# Patient Record
Sex: MALE
[De-identification: names, ages, dates, MRNs, and addresses within clinical notes are randomized per-mention and may not be internally consistent; named-entity substitution may affect disease eponyms.]

---

## 2022-02-22 PROBLEM — Z00.00 ENCOUNTER FOR PREVENTIVE HEALTH EXAMINATION: Status: ACTIVE | Noted: 2022-02-22

## 2022-03-02 ENCOUNTER — APPOINTMENT (OUTPATIENT)
Dept: HUMAN REPRODUCTION | Facility: CLINIC | Age: 42
End: 2022-03-02

## 2022-03-18 ENCOUNTER — APPOINTMENT (OUTPATIENT)
Dept: UROLOGY | Facility: CLINIC | Age: 42
End: 2022-03-18
Payer: COMMERCIAL

## 2022-03-18 ENCOUNTER — NON-APPOINTMENT (OUTPATIENT)
Age: 42
End: 2022-03-18

## 2022-03-18 VITALS
HEART RATE: 44 BPM | TEMPERATURE: 97.9 F | WEIGHT: 150 LBS | SYSTOLIC BLOOD PRESSURE: 113 MMHG | HEIGHT: 69 IN | BODY MASS INDEX: 22.22 KG/M2 | DIASTOLIC BLOOD PRESSURE: 69 MMHG

## 2022-03-18 DIAGNOSIS — E29.1 TESTICULAR HYPOFUNCTION: ICD-10-CM

## 2022-03-18 PROCEDURE — 99204 OFFICE O/P NEW MOD 45 MIN: CPT

## 2022-03-18 NOTE — ASSESSMENT
[FreeTextEntry1] : hypogonadism\par small bilateral varicocele\par i am hesitant to consider varicocelctomy given female age\par TT FSH E2 LH\par consider lexapro\par consider scrotal sono\par proceed to ART

## 2022-03-18 NOTE — PHYSICAL EXAM
[Urethral Meatus] : meatus normal [Penis Abnormality] : normal circumcised penis [Urinary Bladder Findings] : the bladder was normal on palpation [Scrotum] : the scrotum was normal [Epididymis] : the epididymides were normal [Testes Tenderness] : no tenderness of the testes [Testes Mass (___cm)] : there were no testicular masses [FreeTextEntry1] : grade I subtle bilatearl varicocele. 14cc bilateral testes no mass

## 2022-03-18 NOTE — LETTER BODY
[Dear  ___] : Dear  [unfilled], [FreeTextEntry2] : Rachelle Huddleston MD\par [facility]\par [address]\[place] [address] [FreeTextEntry1] : I had the pleasure of seeing ROSY JACKSON, a 41 year old man,  to your patient Fern Cartwright, in the office in consultation today. Please see the attached note for full details.\par \par Thank you very much for allowing me to participate in the care of this patient. If you have any questions please feel free to call me at any time. \par \par \par Sincerely yours,\par \par \par \par Epi Benavides MD, JULIANNE\par Director, Male Fertility and Microsurgery\par  of Urology\par [facility]\par

## 2022-03-18 NOTE — HISTORY OF PRESENT ILLNESS
[FreeTextEntry1] : 41M in relationship with Fern Cartwright (42F) presents with failure to conceive x 6 motnhs. female partner with history of low ovarian reserve. frozen eggs at age 36\par considering IVF\par no previous pregnancies either partner\par no ED\par no EjD\par no pain \par using lexapro and trazodone\par nicorette\par SA:\par 2/2022\par 0.5ml/114 mil/ml/9% motile/21% morph\par 3/14/22\par 0.6 ml/105 mil/ml/40% motile/17% morph\par

## 2022-03-21 LAB
ESTRADIOL SERPL-MCNC: 24 PG/ML
FSH SERPL-MCNC: 3.7 IU/L
LH SERPL-ACNC: 3.7 IU/L
TESTOST FREE SERPL-MCNC: 12.8 PG/ML
TESTOST SERPL-MCNC: 551 NG/DL

## 2023-12-23 ENCOUNTER — OFFICE (OUTPATIENT)
Dept: URBAN - METROPOLITAN AREA CLINIC 92 | Facility: CLINIC | Age: 43
Setting detail: OPHTHALMOLOGY
End: 2023-12-23
Payer: COMMERCIAL

## 2023-12-23 DIAGNOSIS — H52.13: ICD-10-CM

## 2023-12-23 DIAGNOSIS — H16.223: ICD-10-CM

## 2023-12-23 PROCEDURE — 92012 INTRM OPH EXAM EST PATIENT: CPT | Performed by: OPHTHALMOLOGY

## 2023-12-23 PROCEDURE — 92015 DETERMINE REFRACTIVE STATE: CPT | Performed by: OPHTHALMOLOGY

## 2023-12-23 ASSESSMENT — REFRACTION_AUTOREFRACTION
OD_AXIS: 74
OS_AXIS: 96
OD_CYLINDER: +0.75
OD_SPHERE: -2.25
OS_SPHERE: -2.00
OS_CYLINDER: +0.50

## 2023-12-23 ASSESSMENT — TEAR BREAK UP TIME (TBUT)
OS_TBUT: T
OD_TBUT: T

## 2023-12-23 ASSESSMENT — SPHEQUIV_DERIVED
OD_SPHEQUIV: -2
OS_SPHEQUIV: -2
OD_SPHEQUIV: -1.875
OS_SPHEQUIV: -1.75

## 2023-12-23 ASSESSMENT — REFRACTION_MANIFEST
OD_VA1: 20/20
OS_CYLINDER: +0.50
OS_SPHERE: -2.25
OS_VA1: 20/20
OD_AXIS: 085
OD_CYLINDER: +0.50
OD_SPHERE: -2.25
OS_AXIS: 090